# Patient Record
Sex: FEMALE | Race: WHITE | Employment: UNEMPLOYED | URBAN - METROPOLITAN AREA
[De-identification: names, ages, dates, MRNs, and addresses within clinical notes are randomized per-mention and may not be internally consistent; named-entity substitution may affect disease eponyms.]

---

## 2017-01-27 ENCOUNTER — HOSPITAL ENCOUNTER (OUTPATIENT)
Dept: GENERAL RADIOLOGY | Age: 57
Discharge: HOME | End: 2017-01-27
Attending: FAMILY MEDICINE | Admitting: FAMILY MEDICINE

## 2017-01-27 LAB
ALBUMIN SERPL-MCNC: 4.2 GM/DL (ref 3.4–5)
ALP BLD-CCNC: 120 IU/L (ref 40–129)
ALT SERPL-CCNC: 48 U/L (ref 10–40)
ANION GAP SERPL CALCULATED.3IONS-SCNC: 14 MMOL/L (ref 4–16)
AST SERPL-CCNC: 53 IU/L (ref 15–37)
BASOPHILS ABSOLUTE: 0 K/CU MM
BASOPHILS RELATIVE PERCENT: 0.3 % (ref 0–1)
BILIRUB SERPL-MCNC: 0.5 MG/DL (ref 0–1)
BUN BLDV-MCNC: 22 MG/DL (ref 6–23)
CALCIUM SERPL-MCNC: 9.5 MG/DL (ref 8.3–10.6)
CHLORIDE BLD-SCNC: 93 MMOL/L (ref 99–110)
CO2: 26 MMOL/L (ref 21–32)
CREAT SERPL-MCNC: 0.8 MG/DL (ref 0.6–1.1)
DIFFERENTIAL TYPE: ABNORMAL
EOSINOPHILS ABSOLUTE: 0.1 K/CU MM
EOSINOPHILS RELATIVE PERCENT: 1.2 % (ref 0–3)
GFR AFRICAN AMERICAN: >60 ML/MIN/1.73M2
GFR NON-AFRICAN AMERICAN: >60 ML/MIN/1.73M2
GLUCOSE FASTING: 271 MG/DL (ref 70–99)
HCT VFR BLD CALC: 43 % (ref 37–47)
HEMOGLOBIN: 14 GM/DL (ref 12.5–16)
IMMATURE NEUTROPHIL %: 0.2 % (ref 0–0.43)
LYMPHOCYTES ABSOLUTE: 1.2 K/CU MM
LYMPHOCYTES RELATIVE PERCENT: 11.7 % (ref 24–44)
MAGNESIUM: 1.8 MG/DL (ref 1.8–2.4)
MCH RBC QN AUTO: 26.9 PG (ref 27–31)
MCHC RBC AUTO-ENTMCNC: 32.6 % (ref 32–36)
MCV RBC AUTO: 82.7 FL (ref 78–100)
MONOCYTES ABSOLUTE: 0.4 K/CU MM
MONOCYTES RELATIVE PERCENT: 4 % (ref 0–4)
NUCLEATED RBC %: 0 %
PDW BLD-RTO: 14.2 % (ref 11.7–14.9)
PLATELET # BLD: 307 K/CU MM (ref 140–440)
PMV BLD AUTO: 10.7 FL (ref 7.5–11.1)
POTASSIUM SERPL-SCNC: 4 MMOL/L (ref 3.5–5.1)
RBC # BLD: 5.2 M/CU MM (ref 4.2–5.4)
SEGMENTED NEUTROPHILS ABSOLUTE COUNT: 8.5 K/CU MM
SEGMENTED NEUTROPHILS RELATIVE PERCENT: 82.6 % (ref 36–66)
SODIUM BLD-SCNC: 133 MMOL/L (ref 135–145)
TOTAL IMMATURE NEUTOROPHIL: 0.02 K/CU MM
TOTAL NUCLEATED RBC: 0 K/CU MM
TOTAL PROTEIN: 7.3 GM/DL (ref 6.4–8.2)
WBC # BLD: 10.3 K/CU MM (ref 4–10.5)

## 2017-03-28 ENCOUNTER — HOSPITAL ENCOUNTER (OUTPATIENT)
Dept: DIABETES SERVICES | Age: 57
Discharge: HOME | End: 2017-03-31
Attending: FAMILY MEDICINE | Admitting: FAMILY MEDICINE

## 2017-03-28 VITALS — WEIGHT: 293 LBS | HEIGHT: 69 IN | BODY MASS INDEX: 43.4 KG/M2

## 2017-03-28 RX ORDER — GLYBURIDE 1.25 MG/1
2 TABLET ORAL DAILY PRN
COMMUNITY
End: 2020-12-30 | Stop reason: CLARIF

## 2017-03-28 RX ORDER — VENLAFAXINE 25 MG/1
100 TABLET ORAL
COMMUNITY
End: 2017-11-11 | Stop reason: ALTCHOICE

## 2017-04-01 ENCOUNTER — HOSPITAL ENCOUNTER (OUTPATIENT)
Dept: OTHER | Age: 57
Discharge: HOME | End: 2017-04-30
Attending: FAMILY MEDICINE | Admitting: FAMILY MEDICINE

## 2017-05-01 ENCOUNTER — HOSPITAL ENCOUNTER (OUTPATIENT)
Dept: OTHER | Age: 57
Discharge: HOME | End: 2017-05-31
Attending: FAMILY MEDICINE | Admitting: FAMILY MEDICINE

## 2017-11-11 ENCOUNTER — OFFICE VISIT (OUTPATIENT)
Dept: CARDIOLOGY CLINIC | Age: 57
End: 2017-11-11

## 2017-11-11 VITALS
HEART RATE: 68 BPM | BODY MASS INDEX: 43.4 KG/M2 | DIASTOLIC BLOOD PRESSURE: 92 MMHG | WEIGHT: 293 LBS | HEIGHT: 69 IN | SYSTOLIC BLOOD PRESSURE: 136 MMHG

## 2017-11-11 DIAGNOSIS — R06.02 SHORTNESS OF BREATH: Primary | ICD-10-CM

## 2017-11-11 DIAGNOSIS — I10 ESSENTIAL HYPERTENSION: ICD-10-CM

## 2017-11-11 PROCEDURE — 99204 OFFICE O/P NEW MOD 45 MIN: CPT | Performed by: INTERNAL MEDICINE

## 2017-11-11 PROCEDURE — 93000 ELECTROCARDIOGRAM COMPLETE: CPT | Performed by: INTERNAL MEDICINE

## 2017-11-11 RX ORDER — NEBIVOLOL 10 MG/1
10 TABLET ORAL 2 TIMES DAILY
COMMUNITY

## 2017-11-11 RX ORDER — LOSARTAN POTASSIUM AND HYDROCHLOROTHIAZIDE 25; 100 MG/1; MG/1
1 TABLET ORAL PRN
COMMUNITY

## 2017-11-11 RX ORDER — VENLAFAXINE HYDROCHLORIDE 150 MG/1
150 TABLET, EXTENDED RELEASE ORAL
COMMUNITY
End: 2018-04-02

## 2017-11-11 NOTE — PROGRESS NOTES
significant. No palpitations recorded.  H/O cardiovascular stress test 02-    No myocardial ischemia. EF=66%. Normal.  (03-)    H/O cardiovascular stress test 05/12/13    EF 64%, no ischemia, no wall motion abnormality seen, normal perfusion pattern in all myocardial regions.  H/O chest pain     noted on Dr Itzel Cortés request for cardiology services, in patient chart at [facility]    H/O echocardiogram 05/09/13 5/13- EF. 55% mild TR. 2/10Technically difficult. Limited views. LV function normal. EF=>55%  (02-)    H/O transesophageal echocardiography (CLAUDIA) for monitoring 11-    No significant abnormality. No intracardiac shunting.     Heart murmur     Heart palpitations     History of anxiety     History of depression     Hypertension     Hypolipidemia syndrome (HCC)     MR (mitral regurgitation)     Trace - Echo from 11-    Obesity     Ophthalmoplegic migraine     Optic Seizure - focal migraines    Pulmonary hypertension     Mild - Echo from 11-    S/P endometrial ablation     SOB (shortness of breath) on exertion     Tricuspid regurgitation     Mild - Echo from 11-     Past Surgical History:   Procedure Laterality Date    CHOLECYSTECTOMY      HYSTERECTOMY  03-    Dr. Olsen Niece       Family History   Problem Relation Age of Onset    Clotting Disorder Father     Other Mother      AAA    Schizophrenia Brother     Schizophrenia Brother     Other Sister      PVD     Social History   Substance Use Topics    Smoking status: Former Smoker     Years: 4.00     Types: Cigarettes    Smokeless tobacco: Never Used      Comment: Smoked during high school; Caffeine- Occassional pop    Alcohol use No          Review of systems:  HEENT:  Had visual issues  Card:CP, SOB   GI;Neg  : Neg  Neuro: Neg  Psych: Neg  Derm: Neg  MS; Neg  All: Documented  Constitutional: Neg Objective:      Physical Exam:  BP (!) 136/92   Pulse 68   Ht 5' 8.5\" (1.74 m)   Wt (!) 322 lb (146.1 kg)   BMI 48.25 kg/m²   Wt Readings from Last 3 Encounters:   11/11/17 (!) 322 lb (146.1 kg)   03/28/17 (!) 312 lb (141.5 kg)   10/24/13 300 lb (136.1 kg)     Body mass index is 48.25 kg/m². GENERAL - Alert, oriented, pleasant, in no apparent distress. Head unremarkable  Eyes  Not injected conjunctiva  ENT  normal mucosa  Neck - Supple. No jugular venous distention noted. No carotid bruits. Cardiovascular  Normal S1 and S2 without obvious murmur or gallop. Extremities - No cyanosis, clubbing, or significant edema. Pulmonary  No respiratory distress. No wheezes or rales. Pulses: Bilateral radial and pedal pulses normal  Abdomen  no tenderness  Musculoskeletal  normal strength  Neurologic    There are  no gross focal neurologic abnormalities. Skin-  No rash  Affect; normal mood    DATA:  No results found for: CKTOTAL, CKMB, CKMBINDEX, TROPONINI  BNP:  No results found for: BNP  PT/INR:  No results found for: PTINR  No results found for: LABA1C  Lab Results   Component Value Date    CHOL 232 03/02/2009    TRIG 258 03/02/2009    HDL 29 (A) 03/02/2009    LDLCALC 127 03/02/2009     Lab Results   Component Value Date    ALT 48 (H) 01/27/2017    AST 53 (H) 01/27/2017     TSH:  No results found for: TSH      QUALITY MEASURES:  CAD:  No   CHOL LOWERING:  No- if No Why  ANTIPLATELET:  No - if No why  BETA BLOCKER    no  IF  NO WHY  SMOKING HISTORY no COUNSELLED no  ATRIAL FIBRILLATIONno ANTICOAG: no    Assessment/ Plan:     Patient seen , interviewed and examined     - CP and SOB  angelina and echo     -  Hypertension: Patients blood pressure is stable. Patient is advised about low sodium diet. Present medical regimen will not be changed. - ? VHD  reecho    -   DIABETES MELLITUS: Available pertinent lab data reviewed   and  patient was given dietary advice .  Advised to check blood glucose level

## 2017-11-16 ENCOUNTER — TELEPHONE (OUTPATIENT)
Dept: CARDIOLOGY CLINIC | Age: 57
End: 2017-11-16

## 2017-11-17 ENCOUNTER — TELEPHONE (OUTPATIENT)
Dept: CARDIOLOGY CLINIC | Age: 57
End: 2017-11-17

## 2017-11-17 NOTE — TELEPHONE ENCOUNTER
Pt stated that the form she left has to be received by Wednesday next week. She said it could be filled out without the testing being done. She is coming in for testing Monday. FAX: [phone]    Please call pt. Cell phone if not home [phone].

## 2017-11-20 ENCOUNTER — PROCEDURE VISIT (OUTPATIENT)
Dept: CARDIOLOGY CLINIC | Age: 57
End: 2017-11-20

## 2017-11-20 ENCOUNTER — TELEPHONE (OUTPATIENT)
Dept: CARDIOLOGY CLINIC | Age: 57
End: 2017-11-20

## 2017-11-20 DIAGNOSIS — R06.02 SHORTNESS OF BREATH: Primary | ICD-10-CM

## 2017-11-20 DIAGNOSIS — I10 ESSENTIAL HYPERTENSION: ICD-10-CM

## 2017-11-20 DIAGNOSIS — R06.02 SHORTNESS OF BREATH: ICD-10-CM

## 2017-11-20 LAB
LV EF: 58 %
LV EF: 64 %
LVEF MODALITY: NORMAL
LVEF MODALITY: NORMAL

## 2017-11-20 PROCEDURE — 93017 CV STRESS TEST TRACING ONLY: CPT | Performed by: INTERNAL MEDICINE

## 2017-11-20 PROCEDURE — 93306 TTE W/DOPPLER COMPLETE: CPT | Performed by: INTERNAL MEDICINE

## 2017-11-20 PROCEDURE — A9500 TC99M SESTAMIBI: HCPCS | Performed by: INTERNAL MEDICINE

## 2017-11-20 PROCEDURE — 93018 CV STRESS TEST I&R ONLY: CPT | Performed by: INTERNAL MEDICINE

## 2017-11-20 PROCEDURE — 93016 CV STRESS TEST SUPVJ ONLY: CPT | Performed by: INTERNAL MEDICINE

## 2017-11-20 PROCEDURE — 78452 HT MUSCLE IMAGE SPECT MULT: CPT | Performed by: INTERNAL MEDICINE

## 2017-11-20 NOTE — TELEPHONE ENCOUNTER
LV function systolic and size are normal, Ejection Fraction 55-60 %.   Mild left ventricular hypertrophy.   No regional wall motion abnormalities were detected.   Grade I diastolic dysfunction.   Sclerotic, but non-stenotic aortic valve   Normal mitral valve structure and function.   Normal tricuspid valve structure and function.   Trace to mild tricuspid regurgitation.   No evidence of pericardial effusion  Advised pt of echo results.

## 2017-11-21 ENCOUNTER — TELEPHONE (OUTPATIENT)
Dept: CARDIOLOGY CLINIC | Age: 57
End: 2017-11-21

## 2017-12-01 ENCOUNTER — TELEPHONE (OUTPATIENT)
Dept: CARDIOLOGY CLINIC | Age: 57
End: 2017-12-01

## 2017-12-01 NOTE — TELEPHONE ENCOUNTER
Patient advised that Dr. Geneva Benavides would like her to have a [facility]. She wanted to discuss with her . Advised that I will call her back on Friday 12/1/17. She voiced understanding.

## 2017-12-09 ENCOUNTER — OFFICE VISIT (OUTPATIENT)
Dept: CARDIOLOGY CLINIC | Age: 57
End: 2017-12-09

## 2017-12-09 VITALS
WEIGHT: 293 LBS | HEART RATE: 68 BPM | SYSTOLIC BLOOD PRESSURE: 138 MMHG | HEIGHT: 68 IN | DIASTOLIC BLOOD PRESSURE: 82 MMHG | BODY MASS INDEX: 44.41 KG/M2

## 2017-12-09 DIAGNOSIS — I10 ESSENTIAL HYPERTENSION: Primary | ICD-10-CM

## 2017-12-09 PROCEDURE — 99213 OFFICE O/P EST LOW 20 MIN: CPT | Performed by: INTERNAL MEDICINE

## 2017-12-09 NOTE — PROGRESS NOTES
recorded.  H/O cardiovascular stress test 02-    No myocardial ischemia. EF=66%. Normal.  (03-)    H/O cardiovascular stress test 05/12/13    EF 64%, no ischemia, no wall motion abnormality seen, normal perfusion pattern in all myocardial regions.  H/O cardiovascular stress test 11/20/2017    EF64% suggestive of ischemia    H/O chest pain     noted on Dr Yvonne Juarez request for cardiology services, in patient chart at [facility]    H/O echocardiogram 05/09/13 5/13- EF. 55% mild TR. 2/10Technically difficult. Limited views. LV function normal. EF=>55%  (02-)    H/O echocardiogram 11/20/2017    EF55-60% schlerotic non stenosed AV, trace - mild MR    H/O transesophageal echocardiography (CLAUDIA) for monitoring 11-    No significant abnormality. No intracardiac shunting.     Heart murmur     Heart palpitations     History of anxiety     History of depression     Hx of Doppler echocardiogram 11/20/2017    EF55-60%,trace to mild tricuspid regurg    Hypertension     Hypolipidemia syndrome (HCC)     MR (mitral regurgitation)     Trace - Echo from 11-    Obesity     Ophthalmoplegic migraine     Optic Seizure - focal migraines    Pulmonary hypertension     Mild - Echo from 11-    S/P endometrial ablation     SOB (shortness of breath) on exertion     Tricuspid regurgitation     Mild - Echo from 11-     Past Surgical History:   Procedure Laterality Date    CHOLECYSTECTOMY      HYSTERECTOMY  03-    Dr. Gino Chaidez       Family History   Problem Relation Age of Onset    Clotting Disorder Father     Other Mother      AAA    Schizophrenia Brother     Schizophrenia Brother     Other Sister      PVD     Social History   Substance Use Topics    Smoking status: Former Smoker     Years: 4.00     Types: Cigarettes    Smokeless tobacco: Never Used      Comment: Smoked during high be changed. - VHD   No sig VHD    -   DIABETES MELLITUS: Available pertinent lab data reviewed   and  patient was given dietary advice . Advised to check blood glucose level on a regular basis. -   Changes  in medicines made: No           - ?  Antiphopholipid antibody with PCP     - cardiolite abn but had LHC 13 was normal /? braet artifact

## 2018-04-02 PROBLEM — E04.2 MULTINODULAR GOITER: Status: ACTIVE | Noted: 2018-04-02

## 2018-04-24 ENCOUNTER — HOSPITAL ENCOUNTER (OUTPATIENT)
Dept: OTHER | Age: 58
Discharge: HOME | End: 2018-04-24
Attending: SURGERY | Admitting: SURGERY

## 2018-04-24 LAB — CALCIUM SERPL-MCNC: 9.1 MG/DL (ref 8.3–10.6)

## 2018-06-08 ENCOUNTER — OFFICE VISIT (OUTPATIENT)
Dept: CARDIOLOGY CLINIC | Age: 58
End: 2018-06-08

## 2018-06-08 VITALS
SYSTOLIC BLOOD PRESSURE: 132 MMHG | WEIGHT: 293 LBS | HEART RATE: 76 BPM | HEIGHT: 68 IN | DIASTOLIC BLOOD PRESSURE: 84 MMHG | BODY MASS INDEX: 44.41 KG/M2

## 2018-06-08 DIAGNOSIS — I10 ESSENTIAL HYPERTENSION: Primary | ICD-10-CM

## 2018-06-08 DIAGNOSIS — E04.2 MULTINODULAR GOITER: ICD-10-CM

## 2018-06-08 PROCEDURE — 99214 OFFICE O/P EST MOD 30 MIN: CPT | Performed by: INTERNAL MEDICINE

## 2018-06-08 RX ORDER — METFORMIN HYDROCHLORIDE 500 MG/1
TABLET, EXTENDED RELEASE ORAL
Refills: 3 | COMMUNITY
Start: 2018-05-09

## 2018-06-08 RX ORDER — LEVOTHYROXINE SODIUM 200 MCG
TABLET ORAL
Refills: 11 | COMMUNITY
Start: 2018-06-06

## 2018-08-08 ENCOUNTER — TELEPHONE (OUTPATIENT)
Dept: CARDIOLOGY CLINIC | Age: 58
End: 2018-08-08

## 2018-08-08 NOTE — LETTER
ATTENTION PATIENT: Pretesting is to be done before the cath. You do not have to fast for the lab work. You must go to the [facility] at [address] [place]. to have this lab work done. Phone: [phone] Hours: 7:00 am to 5:00 pm Monday  Friday      PHYSICIAN SIGNATURE:      DATE:                                                          [facility] ([facility]) Informed Consent for Anesthesia/Sedation, Surgery, Invasive Procedures, and other High-risk Interventions and Medication use      *This consent is applicable for 30 days following patient signature*    Procedure(s)   [place] authorize, Dr. Garrett Chen    and the associate(s) or assistant(s) of his/her choice, to perform the following procedure(s): [id number]     I know that unexpected conditions may require additional or different procedures than those above. I authorize the above named practitioner(s) perform these as necessary and desirable. This is based on the practitioners professional judgment. The above named practitioner has discussed the above procedure(s) with me, including:  ? Potential benefits, including likelihood of success of the procedure(s) goals  ? Risks  ? Side effects, risk of death, and risk of infection  ? Any potential problems that might occur during recuperation or healing post-procedure  ? Reasonable alternatives  ? Risks of NOT performing the procedure(s)    I acknowledge that no warranty or guarantee has been made to the results the procedure(s). I consent to the above named practitioner(s) providing additional services to me as deemed reasonable and necessary, including but not limited to:    ? Use of medications for anesthesia or sedation. ? All anesthesia and sedation carry risks.   My practitioner has discussed my anticipated anesthesia and/or sedation and the risks of using, risk of not using, benefits, side effects, and alternatives. ? Use of pathology  ? I authorize [facility] ([facility]) to dispose of tissues, specimens or organs when pathology is complete. ? Use of radiology  ? A contrast agent may be required for radiology procedures. My practitioner has advised me of the risks of using, risks of not using, benefits, side effects, and alternatives. ? Observers or use of photography, video/audio recording, or televising of the procedure(s). This is for medical, scientific, or educational purposes. This includes appropriate portions of my body. My identity will not be revealed. ? I consent to release of my social security number and other identifying information to [employer] (FDA), and the supplier/, if I receive tissue, a device, or implant. This is to track the tissue, device, or implant for defect, recall, infection, etc.     ? Use of blood and/or blood products, if needed, through my hospital stay. My practitioner has advised me of the risks of using, risks of not using, benefits, side effects, and alternatives. ___ I do NOT want Blood or Blood products given. (Complete separate  refusal form)    Code Status (shahab one):  ___ I do NOT HAVE a DNR order. I am a Full-code.   I will receive CPR, intubation,  chest compressions, medications, and/or other life saving measures if I have a  cardiac or respiratory arrest.    ___ I have a Do Not Resuscitate (DNR)order.   (shahab one below)  ___  I rescind my DNR for surgery and immediate post-operative period through Phase 2 recovery.    This means, for that time period, I will be a Full-code and receive CPR, intubation, chest compressions, medications, and/or other life saving measures, if I have a cardiac or respiratory arrest.

## 2018-08-14 ENCOUNTER — TELEPHONE (OUTPATIENT)
Dept: CARDIOLOGY CLINIC | Age: 58
End: 2018-08-14

## 2018-08-14 NOTE — TELEPHONE ENCOUNTER
Patient here in office and educated on 08/14/2018, schedule for LHC @ 1000, with arrival @ 0800, @ [facility]; risk explained; and consents signed. Also copy of orders given for labs and CXR due 08/16/2018-08/17/2018 at [facility]. Instruction given to patient to :  NPO after midnight the night before procedure; call hospital at [phone] to pre-register. May take rest of morning meds of procedure except Hyzaar . Hold Metformin the day before, the day of and two days after procedure. Patient voiced understanding. Copies of consent & info sheet given to [facility] for scanning.

## 2018-08-16 ENCOUNTER — HOSPITAL ENCOUNTER (OUTPATIENT)
Dept: GENERAL RADIOLOGY | Age: 58
Discharge: HOME | End: 2018-08-16
Attending: INTERNAL MEDICINE | Admitting: INTERNAL MEDICINE

## 2018-08-16 DIAGNOSIS — Z01.811 PRE-OP CHEST EXAM: ICD-10-CM

## 2018-08-16 LAB
ANION GAP SERPL CALCULATED.3IONS-SCNC: 15 MMOL/L (ref 4–16)
APTT: 29.8 SECONDS (ref 21.2–33)
BASOPHILS ABSOLUTE: 0.1 K/CU MM
BASOPHILS RELATIVE PERCENT: 0.8 % (ref 0–1)
BUN BLDV-MCNC: 14 MG/DL (ref 6–23)
CALCIUM SERPL-MCNC: 10.2 MG/DL (ref 8.3–10.6)
CHLORIDE BLD-SCNC: 94 MMOL/L (ref 99–110)
CO2: 28 MMOL/L (ref 21–32)
CREAT SERPL-MCNC: 1 MG/DL (ref 0.6–1.1)
DIFFERENTIAL TYPE: ABNORMAL
EOSINOPHILS ABSOLUTE: 0.2 K/CU MM
EOSINOPHILS RELATIVE PERCENT: 2 % (ref 0–3)
GFR AFRICAN AMERICAN: >60 ML/MIN/1.73M2
GFR NON-AFRICAN AMERICAN: 57 ML/MIN/1.73M2
GLUCOSE BLD-MCNC: 133 MG/DL (ref 70–99)
HCT VFR BLD CALC: 43.3 % (ref 37–47)
HEMOGLOBIN: 14 GM/DL (ref 12.5–16)
IMMATURE NEUTROPHIL %: 0.4 % (ref 0–0.43)
INR BLD: 1.1 INDEX
LYMPHOCYTES ABSOLUTE: 4.5 K/CU MM
LYMPHOCYTES RELATIVE PERCENT: 37.2 % (ref 24–44)
MCH RBC QN AUTO: 28.9 PG (ref 27–31)
MCHC RBC AUTO-ENTMCNC: 32.3 % (ref 32–36)
MCV RBC AUTO: 89.5 FL (ref 78–100)
MONOCYTES ABSOLUTE: 0.6 K/CU MM
MONOCYTES RELATIVE PERCENT: 4.9 % (ref 0–4)
NUCLEATED RBC %: 0 %
PDW BLD-RTO: 13.7 % (ref 11.7–14.9)
PLATELET # BLD: 435 K/CU MM (ref 140–440)
PMV BLD AUTO: 10.9 FL (ref 7.5–11.1)
POTASSIUM SERPL-SCNC: 4.1 MMOL/L (ref 3.5–5.1)
PROTHROMBIN TIME: 12.5 SECONDS (ref 9.12–12.5)
RBC # BLD: 4.84 M/CU MM (ref 4.2–5.4)
SEGMENTED NEUTROPHILS ABSOLUTE COUNT: 6.7 K/CU MM
SEGMENTED NEUTROPHILS RELATIVE PERCENT: 54.7 % (ref 36–66)
SODIUM BLD-SCNC: 137 MMOL/L (ref 135–145)
TOTAL IMMATURE NEUTOROPHIL: 0.05 K/CU MM
TOTAL NUCLEATED RBC: 0 K/CU MM
WBC # BLD: 12.2 K/CU MM (ref 4–10.5)

## 2018-12-19 ENCOUNTER — OFFICE VISIT (OUTPATIENT)
Dept: CARDIOLOGY CLINIC | Age: 58
End: 2018-12-19
Payer: COMMERCIAL

## 2018-12-19 VITALS
DIASTOLIC BLOOD PRESSURE: 80 MMHG | SYSTOLIC BLOOD PRESSURE: 130 MMHG | HEART RATE: 64 BPM | BODY MASS INDEX: 43.4 KG/M2 | WEIGHT: 293 LBS | HEIGHT: 69 IN

## 2018-12-19 DIAGNOSIS — I10 ESSENTIAL HYPERTENSION: Primary | ICD-10-CM

## 2018-12-19 PROCEDURE — 99213 OFFICE O/P EST LOW 20 MIN: CPT | Performed by: INTERNAL MEDICINE

## 2020-12-15 LAB
CHOLESTEROL, TOTAL: 251 MG/DL
CHOLESTEROL/HDL RATIO: NORMAL
HDLC SERPL-MCNC: 36 MG/DL (ref 35–70)
LDL CHOLESTEROL CALCULATED: 154 MG/DL (ref 0–160)
NONHDLC SERPL-MCNC: NORMAL MG/DL
TRIGL SERPL-MCNC: 324 MG/DL
VLDLC SERPL CALC-MCNC: 61 MG/DL

## 2020-12-29 ENCOUNTER — CLINICAL DOCUMENTATION (OUTPATIENT)
Dept: CARDIOLOGY CLINIC | Age: 60
End: 2020-12-29

## 2020-12-30 ENCOUNTER — OFFICE VISIT (OUTPATIENT)
Dept: CARDIOLOGY CLINIC | Age: 60
End: 2020-12-30
Payer: COMMERCIAL

## 2020-12-30 VITALS
SYSTOLIC BLOOD PRESSURE: 134 MMHG | HEART RATE: 72 BPM | HEIGHT: 69 IN | BODY MASS INDEX: 42.36 KG/M2 | DIASTOLIC BLOOD PRESSURE: 84 MMHG | WEIGHT: 286 LBS

## 2020-12-30 PROCEDURE — 93000 ELECTROCARDIOGRAM COMPLETE: CPT | Performed by: INTERNAL MEDICINE

## 2020-12-30 PROCEDURE — 99204 OFFICE O/P NEW MOD 45 MIN: CPT | Performed by: INTERNAL MEDICINE

## 2020-12-30 RX ORDER — ERGOCALCIFEROL (VITAMIN D2) 1250 MCG
50000 CAPSULE ORAL
COMMUNITY
Start: 2020-02-14 | End: 2020-12-30 | Stop reason: CLARIF

## 2020-12-30 RX ORDER — LORAZEPAM 1 MG/1
0.5 TABLET ORAL EVERY 6 HOURS PRN
COMMUNITY

## 2020-12-30 RX ORDER — EVOLOCUMAB 140 MG/ML
140 INJECTION, SOLUTION SUBCUTANEOUS
Qty: 2 SYRINGE | Refills: 0 | COMMUNITY
Start: 2020-12-30 | End: 2021-04-26

## 2020-12-30 RX ORDER — ALIROCUMAB 75 MG/ML
75 INJECTION, SOLUTION SUBCUTANEOUS
Qty: 10 ML | Refills: 3 | Status: SHIPPED | OUTPATIENT
Start: 2020-12-30 | End: 2020-12-30

## 2020-12-30 RX ORDER — ERGOCALCIFEROL 1.25 MG/1
CAPSULE ORAL
COMMUNITY
Start: 2020-10-22 | End: 2020-12-30 | Stop reason: CLARIF

## 2020-12-30 RX ORDER — ASPIRIN 325 MG
325 TABLET ORAL DAILY
COMMUNITY

## 2020-12-30 NOTE — LETTER
[address] [facility]  1960  [id number]    Have you had any Chest Pain that is not new? - No    Have you had any Shortness of Breath - Yes  If Yes - When on exertion    Have you had any dizziness - No    Have you had any palpitations that are not new? - Yes  If Yes DO EKG - Do you feel your heart skipping  How long does it last - .10  seconds     Do you have any edema - swelling in No      Vein \"LEG PROBLEM Questionnaire\"  1. Do you have prominent leg veins? No   2. Do you have any skin discoloration? No  3. Do you have any healed or active sores? No  4. Do you have swelling of the legs? No  5. Do you have a family history of varicose veins? No  6. Does your profession involve pro-longed        standing or heavy lifting? No  7. Have you been fighting overweight problems? No  8. Do you have restless legs? No  9. Do you have any night time cramps? No  10.  Do you have any of the following in your legs:    Do you have a surgery or procedure scheduled in the near future - No

## 2020-12-30 NOTE — PROGRESS NOTES
CARDIOLOGY NOTE      12/30/2020    RE: Karina Perez  (1960)                               TO:  Dr. Gabriela Thomas DO            Carmine Weiss is a 63 y.o. female who was seen today for management of  hyperlipidimea                                    HPI:                   The patient does not have cardiac complaints  Patient also seen  for    - Hyperlipidimea, importance of hyperlipidimea discussed with pt. - Hypertension,is  well controlled, pt is  compliant with medicines  - Diabetes mellitus, blood glucose level is  well controlled. Pt is compliant with meds and diet      Karina Perez has the following history recorded in care path:  Patient Active Problem List    Diagnosis Date Noted    ASCVD (arteriosclerotic cardiovascular disease)      Priority: High    Multinodular goiter 04/02/2018     Priority: Low    Chest pain 06/06/2013     Priority: Low    H/O cardiovascular stress test 05/12/2013     Priority: Low    HTN (hypertension) 05/08/2013     Priority: Low    Heart palpitations      Priority: Low    Pulmonary hypertension (HCC)      Priority: Low     Current Outpatient Medications   Medication Sig Dispense Refill    LORazepam (ATIVAN) 1 MG tablet Take 0.5 mg by mouth every 6 hours as needed for Anxiety.  aspirin 325 MG tablet Take 325 mg by mouth daily      metFORMIN (GLUCOPHAGE-XR) 500 MG extended release tablet TAKE 2 TABLETS BY MOUTH EVERY DAY  3    SYNTHROID 200 MCG tablet TAKE 1 TABLET BY MOUTH EVERY DAY 250mcg/d  11    venlafaxine (EFFEXOR XR) 150 MG extended release capsule       nebivolol (BYSTOLIC) 10 MG tablet Take 10 mg by mouth 2 times daily      losartan-hydrochlorothiazide (HYZAAR) 100-25 MG per tablet Take 1 tablet by mouth as needed       traMADol (ULTRAM) 50 MG tablet Take 50 mg by mouth every 6 hours as needed.  omeprazole (PRILOSEC) 20 MG capsule        No current facility-administered medications for this visit.       Allergies: Latex, Amoxapine and related, Clozapine, Demerol hcl [meperidine], Amoxicillin, Statins [statins], Canagliflozin, Dapagliflozin, Morphine, Propoxyphene, and Vicodin [hydrocodone-acetaminophen]  Past Medical History:   Diagnosis Date    Antiphospholipid antibody syndrome ([facility] [address]     Arthritis     Deep vein thrombosis (DVT) (HCC)     Right Brachial    Diabetes mellitus (HCC)     GERD (gastroesophageal reflux disease)     H/O 24 hour EKG monitoring 02-    (48 Hours) Baseline rhythm - NS. Brief episodes bradycardia/tachycardia - not clinically significant. No palpitations recorded.  H/O cardiac catheterization 08/20/2018    normal study    H/O cardiovascular stress test 02-    No myocardial ischemia. EF=66%. Normal.  (03-)    H/O cardiovascular stress test 05/12/13    EF 64%, no ischemia, no wall motion abnormality seen, normal perfusion pattern in all myocardial regions.  H/O cardiovascular stress test 11/20/2017    EF64% suggestive of ischemia    H/O chest pain     noted on Dr Katie Augustine request for cardiology services, in patient chart at [facility]    H/O echocardiogram 05/09/13 5/13- EF. 55% mild TR. 2/10Technically difficult. Limited views. LV function normal. EF=>55%  (02-)    H/O echocardiogram 11/20/2017    EF55-60% schlerotic non stenosed AV, trace - mild MR    H/O transesophageal echocardiography (CLAUDIA) for monitoring 11-    No significant abnormality. No intracardiac shunting.     Heart murmur     Heart palpitations     History of anxiety     History of depression     Hx of Doppler echocardiogram 11/20/2017    EF55-60%,trace to mild tricuspid regurg    Hypertension     Hypolipidemia syndrome (HCC)     MR (mitral regurgitation)     Trace - Echo from 11-    Multinodular goiter 4/2/2018    Obesity     Ophthalmoplegic migraine     Optic Seizure - focal migraines    Pulmonary hypertension (HCC)     Mild - Echo from 11-    S/P endometrial ablation     SOB (shortness of breath) on exertion     Tricuspid regurgitation     Mild - Echo from 11-     Past Surgical History:   Procedure Laterality Date    CHOLECYSTECTOMY      HYSTERECTOMY  03-    Dr. Morillo Falling Right     THYROIDECTOMY N/A 04/2018    TONSILLECTOMY AND ADENOIDECTOMY      TUBAL LIGATION        As reviewed   Family History   Problem Relation Age of Onset    Clotting Disorder Father     Diabetes Father     Other Mother         AAA    Asthma Mother     Diabetes Mother     Schizophrenia Brother     Diabetes Brother     Schizophrenia Brother     Diabetes Brother     Other Sister         PVD    Diabetes Sister     Coronary Art Dis Sister      Social History     Tobacco Use    Smoking status: Former Smoker     Years: 4.00     Types: Cigarettes    Smokeless tobacco: Never Used    Tobacco comment: Smoked during high school; Caffeine- Occassional pop   Substance Use Topics    Alcohol use: No      Review of Systems:    Constitutional: Negative for diaphoresis and fatigue  Psychological:Negative for anxiety or depression  HENT: Negative for headaches, nasal congestion, sinus pain or vertigo  Eyes: Negative for visual disturbance.    Endocrine: Negative for polydipsia/polyuria  Respiratory: Negative for shortness of breath  Cardiovascular: Negative for chest pain, dyspnea on exertion, claudication, edema, irregular heartbeat, murmur, palpitations or shortness of breath  Gastrointestinal: Negative for abdominal pain or heartburn  Genito-Urinary: Negative for urinary frequency/urgency  Musculoskeletal: Negative for muscle pain, muscular weakness, negative for pain in arm and leg or swelling in foot and leg  Neurological: Negative for dizziness, headaches, memory loss, numbness/tingling, visual changes, syncope  Dermatological: Negative for rash    Objective:    Vitals:    12/30/20 1213   BP: 134/84   Pulse: 72   Weight: 286 lb (129.7 kg)   Height: 5' 8.5\" (1.74 m)     /84   Pulse 72   Ht 5' 8.5\" (1.74 m)   Wt 286 lb (129.7 kg)   BMI 42.85 kg/m²     No flowsheet data found. Wt Readings from Last 3 Encounters:   12/30/20 286 lb (129.7 kg)   12/19/18 (!) 308 lb (139.7 kg)   08/20/18 (!) 312 lb (141.5 kg)     Body mass index is 42.85 kg/m². GENERAL - Alert, oriented, pleasant, in no apparent distress. EYES: No jaundice, no conjunctival pallor. SKIN: It is warm & dry. No rashes. No Echhymosis    HEENT - No clinically significant abnormalities seen. Neck - Supple. No jugular venous distention noted. No carotid bruits. Cardiovascular - Normal S1 and S2 without obvious murmur or gallop. Extremities - No cyanosis, clubbing, or significant edema. Pulmonary - No respiratory distress. No wheezes or rales. Abdomen - No masses, tenderness, or organomegaly. Musculoskeletal - No significant edema. No joint deformities. No muscle wasting. Neurologic - Cranial nerves II through XII are grossly intact. There were no gross focal neurologic abnormalities.     Lab Review   No results found for: CKTOTAL, CKMB, CKMBINDEX, TROPONINT  BNP:  No results found for: BNP  PT/INR:    Lab Results   Component Value Date    INR 1.10 08/16/2018     No results found for: LABA1C  Lab Results   Component Value Date    WBC 12.2 (H) 08/16/2018    HCT 43.3 08/16/2018    MCV 89.5 08/16/2018     08/16/2018     Lab Results   Component Value Date    CHOL 232 03/02/2009    TRIG 258 03/02/2009    HDL 29 (A) 03/02/2009    LDLCALC 127 03/02/2009     Lab Results   Component Value Date    ALT 48 (H) 01/27/2017    AST 53 (H) 01/27/2017     BMP:    Lab Results   Component Value Date     08/16/2018    K 4.1 08/16/2018    CL 94 08/16/2018    CO2 28 08/16/2018    BUN 14 08/16/2018    CREATININE 1.0 08/16/2018     CMP:   Lab Results   Component Value Date     08/16/2018    K 4.1 08/16/2018    CL 94 08/16/2018    CO2 28 08/16/2018    BUN 14 08/16/2018    PROT 7.3 01/27/2017     TSH:  No results found for: TSH, TSHHS      Impression:    1. Palpitations    2. Shortness of breath       Patient Active Problem List   Diagnosis Code    Heart palpitations R00.2    Pulmonary hypertension (HCC) I27.20    HTN (hypertension) I10    H/O cardiovascular stress test Z92.89    Chest pain R07.9    Multinodular goiter E04.2    ASCVD (arteriosclerotic cardiovascular disease) I25.10       Assessment & Plan:    -  LIPID MANAGEMENT:  Importance of lipid levels discussed with patient   and patient was given dietary advice. NCEP- ATP III guidelines reviewed with patient. -   Changes  in medicines made: Yes        Intolerant to statin  Gets myalgia  Have tried multiple statin and zetia ascwell need PCSK 9                      -  Hypertension: Patients blood pressure is normal. Patient is advised about low sodium diet. Present medical regimen will not be changed. - Has low TSH  Dose adjusted    -   DIABETES MELLITUS: Available pertinent lab data reviewed   and  patient was given dietary advice . Advised to check blood glucose level on a regular basis. -   Changes  in medicines made: No                Mortality from the morbid obesity is very high: Body mass index is 42.85 kg/m².                              Barrie Joseph MD    [facility] [place]

## 2021-01-21 ENCOUNTER — TELEPHONE (OUTPATIENT)
Dept: CARDIOLOGY CLINIC | Age: 61
End: 2021-01-21

## 2021-01-21 NOTE — LETTER
[place] [address] [place], [place] [address]  [place], [address] [facility]  Phone: [phone]     Fax: [phone]  MD Eris Galeas MD Tonye Rickers, MD Esequiel Boys, MD Lorelle Dues, MD Marzella Dart, MD         2021    ATTN: Medical Review  RE: Panfilo Lancaster  : 1960  Payor: Marleen Karel #: [id number]     To whom it may concern:       I am writing on behalf of Panfilo Lancaster to formally document the medical necessity for administering REPATHA (evolobumab). Shanerosangela Negreteet is indicated to reduce the risk of myocardial infarction (MI), stroke, and unstable angina requiring hospitalization in adults with established cardiovascular disease; and as adjunct to diet, alone or in combination with other lipid-lowering therapies (e.g., statins, ezetimibe), for the treatment of adults with primary hyperlipidemia (including heterozygous familial hypercholesterolemia) to reduce low-density lipoprotein cholesterol LDL-C. This letter provides the clinical history, treatment rationale, and other documents that support the use of REPATHA for this patient. Carlo Christianson is a 63year-old female who was has Hyperlipidemia with Lipid levels as follows; Cholesterol: 251, HDL: 36, Triglycerides: 324 and LDL: 154. Currently the patient exercises daily, consumes a healthy diet, does not smoke, yet continues to have worsening Lipid Levels. Patient is currently not taking any lipid lowering therapy due to intolerance to statins caused by myalgias to include Zetia and will benefit from taking Repatha injections as this promotes better risk stratification in decreasing the chances of increasing lipid levels and possible cardiac events. REPATHA (evolobumab) is a PCSK9 (Proprotein Convertase Subtilisin/Kexin Type 9) inhibitor antibody indicated as adjunct to diet and maximally tolerated statin therapy for the treatment of adults with heterozygous familial hypercholesterolemia or clinical atherosclerotic cardiovascular disease, who require additional lowering of LDL-C. In summary, based on my clinical judgement, Blanca London is medically necessary for Tigre Ernst. Please contact me at [phone] if additional information is required for approval of this request.      Thank you for your immediate attention to this very important matter.        Sincerely,       Bernadette Bailey MD, [facility] [place]   FA/bjmary ann

## 2021-02-01 ENCOUNTER — TELEPHONE (OUTPATIENT)
Dept: CARDIOLOGY CLINIC | Age: 61
End: 2021-02-01

## 2021-02-01 DIAGNOSIS — E78.2 MIXED HYPERLIPIDEMIA: Primary | ICD-10-CM

## 2021-03-17 NOTE — TELEPHONE ENCOUNTER
Per Radha Salvador NP:    Intolerant to statin   Gets myalgia   Have tried multiple statin and zetia marleni need PCSK 9 .  OK to order calcium scoring if needed

## 2021-03-22 ENCOUNTER — TELEPHONE (OUTPATIENT)
Dept: CARDIOLOGY CLINIC | Age: 61
End: 2021-03-22

## 2021-03-22 NOTE — TELEPHONE ENCOUNTER
Patient scheduled for CT Calcium Scoring 3/25/21 at 1 pm with arrival of 1230 pm at [facility]. Patient also advised that out of pocket cost is flat fee of $109. No prep for test. Patient voiced understanding.

## 2021-03-25 ENCOUNTER — HOSPITAL ENCOUNTER (OUTPATIENT)
Dept: CT IMAGING | Age: 61
Discharge: HOME | End: 2021-03-25
Payer: COMMERCIAL

## 2021-03-25 DIAGNOSIS — E78.2 MIXED HYPERLIPIDEMIA: ICD-10-CM

## 2021-03-25 PROCEDURE — 75571 CT HRT W/O DYE W/CA TEST: CPT

## 2021-03-26 NOTE — TELEPHONE ENCOUNTER
Patient advised of CAC scoring results. Advised that I will check with Dr. Marlo Kanner for further instruction on 4/7/21. She voice understanding.

## 2021-03-29 NOTE — TELEPHONE ENCOUNTER
Left message on Patient's voicemail to call office to get results of CT Cardiac Calcium scoring. Calcium Score : Left Main:0 Agatston , Left Anterior Descending Artery :2    Agatston  Left Circumflex : 0 Agatston  Right coronary artery : 5 Agatston            Total score of  7  Agatston of coronary calcification   However aortic calcification noted    CALCIUM SCORE:  1-10   Plaque Baldwin:  Minimal identifiable. Probability of Significant CAD:  Very unlikely, <10%   Cardiovascular risk:  Low. Recommendation:  Discuss general public health guidelines for primary    prevention of cardiovascular disease.

## 2022-09-24 ENCOUNTER — HOSPITAL ENCOUNTER (EMERGENCY)
Age: 62
Discharge: HOME | End: 2022-09-24
Attending: EMERGENCY MEDICINE
Payer: COMMERCIAL

## 2022-09-24 ENCOUNTER — APPOINTMENT (OUTPATIENT)
Dept: GENERAL RADIOLOGY | Age: 62
End: 2022-09-24
Payer: COMMERCIAL

## 2022-09-24 VITALS
HEART RATE: 96 BPM | WEIGHT: 286 LBS | OXYGEN SATURATION: 97 % | DIASTOLIC BLOOD PRESSURE: 102 MMHG | BODY MASS INDEX: 43.35 KG/M2 | SYSTOLIC BLOOD PRESSURE: 221 MMHG | TEMPERATURE: 97.6 F | RESPIRATION RATE: 18 BRPM | HEIGHT: 68 IN
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
## 2022-09-24 DIAGNOSIS — T07.XXXA STRAIN OF MUSCLE OF MULTIPLE SITES: ICD-10-CM

## 2022-09-24 DIAGNOSIS — S49.92XA INJURY OF LEFT SHOULDER, INITIAL ENCOUNTER: ICD-10-CM

## 2022-09-24 PROCEDURE — 96372 THER/PROPH/DIAG INJ SC/IM: CPT

## 2022-09-24 PROCEDURE — 71046 X-RAY EXAM CHEST 2 VIEWS: CPT

## 2022-09-24 PROCEDURE — 6360000002 HC RX W HCPCS: Performed by: EMERGENCY MEDICINE

## 2022-09-24 PROCEDURE — 93005 ELECTROCARDIOGRAM TRACING: CPT | Performed by: EMERGENCY MEDICINE

## 2022-09-24 PROCEDURE — 99284 EMERGENCY DEPT VISIT MOD MDM: CPT

## 2022-09-24 PROCEDURE — 73030 X-RAY EXAM OF SHOULDER: CPT

## 2022-09-24 RX ORDER — KETOROLAC TROMETHAMINE 30 MG/ML
30 INJECTION, SOLUTION INTRAMUSCULAR; INTRAVENOUS ONCE
Status: COMPLETED | OUTPATIENT
Start: 2022-09-24 | End: 2022-09-24

## 2022-09-24 RX ADMIN — KETOROLAC TROMETHAMINE 30 MG: 30 INJECTION, SOLUTION INTRAMUSCULAR at 14:30
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
## 2022-09-24 NOTE — ED NOTES
Trauma Assessment:  Arrived via:  [x]Ambulance  []Helicopter  []Police  [] Private Vehicle  [x] EMS  []Transfer from:   _________________________  []EMS report in      Pt chart Pre-hospital Interventions  Airway:  [x]Oral         []Nasal       []Intubated      []O2  []IV size _____ site ________       []IV #2 size _____ site ________  []Blood sugar _______ mg/dl  []CPR         []LBB         []C collar         []MAST  []Splint type __________ location ___________         Meds:  []Morphine ______ mg       []Versed ______mg   [] _______________________    ______mg       Airway [x]Patent/talking  [x]Clear  []Partially obstructed  []Completely obstructed  []Breathing assisted  []Intubated  [] ___________________     Breathing [x]Spontaneous  []Labored  []Agonal  []No effort    Trachea:  [x]Midline  []Deviated  []R  []L    Chest wall symmetry:  []Symmetrical  []Asymmetrical Lung sounds:   L  R  [x] [x]Present  [] []Clear  [] []Diminished  [] []Absent  [] []Rales  [] []Rhonchi  [] []Wheezes               Circulation Skin:  [x]Warm      [x]Pink  []Cool        []Pale  []Hot          []Flushed  [x]Dry          []Ashen  []Moist       []Cyanotic  []Diaphoretic   Pulse:  [x]Central pulse present  [x]Peripheral pulse present  []No pulse    [x]Strong  []Thready  Capillary refill   ___<3____ sec.      Disability Iraida Coma Scale (GCS)    Eye Opening Verbal Motor    [x]4 Spontaneous  []3 To Verbal  []2 To Pain  []1 None [x]5 Oriented  []4 Confused  []3 Inappropriate response  []2 Incomprehensible  []1 None/Intubated [x]6 Obeys  []5 Localizes pain  []4 Withdraws from pain  []3 Flexor posturing  []2 Extensor posturing  []1 None/chemically paralyzed     Pupils   L  [x]Brisk  []Sluggish   []Non-reactive  ___4____mm R  [x]Brisk  []Sluggish  []Non-reactive  ___4___mm         Head []Pain/tenderness  Drainage from: []ears   []nose   []mouth     Neck []Pain/tenderness  []JVD   Chest []Pain/tenderness    []Dyspnea       [] Deformity [] Paradoxical expansion   Abdomen []Pain    []Tender        []Rigid             [x]Bowel sounds present   []Soft     []Guarded      []Distended    []Bowel sounds absent  Emesis/gastrocult:  []+   []-     Pelvis/  Genital []Pain/tenderness          Pelvis: [x]stable   []unstable              []Blood at the meatus    Rectal tone: []present   []absent      Hemocult: []+   []-   Extremities [x]Pain/tenderness           [x]CMS intact x4  [x]Moves all extremities    [x]Extremities warm and pink   Back []Pain/tenderness  []Deformity          Henrietta Finley RN  09/24/22 [id number]

## 2022-09-25 LAB
EKG ATRIAL RATE: 83 BPM
EKG DIAGNOSIS: NORMAL
EKG P AXIS: 40 DEGREES
EKG P-R INTERVAL: 196 MS
EKG Q-T INTERVAL: 406 MS
EKG QRS DURATION: 120 MS
EKG QTC CALCULATION (BAZETT): 477 MS
EKG R AXIS: -46 DEGREES
EKG T AXIS: 48 DEGREES
EKG VENTRICULAR RATE: 83 BPM

## 2022-09-25 PROCEDURE — 93010 ELECTROCARDIOGRAM REPORT: CPT | Performed by: INTERNAL MEDICINE

## 2022-12-01 ENCOUNTER — TELEPHONE (OUTPATIENT)
Dept: CARDIOLOGY CLINIC | Age: 62
End: 2022-12-01

## 2022-12-01 NOTE — TELEPHONE ENCOUNTER
Left message for patient requesting a return call to schedule an echo for shortness of breath, atherosclerotic heart disease and essential hypertension per referral from [facility]. No auth required.

## 2022-12-09 ENCOUNTER — PROCEDURE VISIT (OUTPATIENT)
Dept: CARDIOLOGY CLINIC | Age: 62
End: 2022-12-09

## 2022-12-09 DIAGNOSIS — R06.02 SOB (SHORTNESS OF BREATH): Primary | ICD-10-CM

## 2022-12-16 ENCOUNTER — TELEPHONE (OUTPATIENT)
Dept: CARDIOLOGY CLINIC | Age: 62
End: 2022-12-16

## 2023-03-08 ENCOUNTER — TELEPHONE (OUTPATIENT)
Dept: CARDIOLOGY CLINIC | Age: 63
End: 2023-03-08

## 2023-03-08 NOTE — TELEPHONE ENCOUNTER
Cardiologist: Dr. Chico Walsh  Surgeon: Dr. Lottie Deras  Surgery: Left knee chondroplasty/ Med Meniscectomy  Anesthesia: General  Date: TBD  FAX# [phone]    Ph# [phone]    Last OV 12/30/2020 w/Piper      -  LIPID MANAGEMENT:  Importance of lipid levels discussed with patient   and patient was given dietary advice. NCEP- ATP III guidelines reviewed with patient. -   Changes  in medicines made: Yes        Intolerant to statin  Gets myalgia  Have tried multiple statin and zetia ascwell need PCSK 9                       -  Hypertension: Patients blood pressure is normal. Patient is advised about low sodium diet. Present medical regimen will not be changed. - Has low TSH  Dose adjusted     -   DIABETES MELLITUS: Available pertinent lab data reviewed   and  patient was given dietary advice . Advised to check blood glucose level on a regular basis. -   Changes  in medicines made: No          Last EKG- 9/25/2022        NM- 11/20/2017   ECG portion of stress test is negative for ischemia by diagnostic criteria. Normal EF 64 % with normal ventricular contractility. Moderate intensity , large size anterior defect seen in stress images    suggestive of ischemia    Abnormal study , abnormal stress images         Echo- 12/9/2022   Left ventricular function and size is normal, EF is estimated at 55-60%. Moderate left ventricular hypertrophy. Grade I diastolic dysfunction. No regional wall motion abnormalities were detected. No significant valvular disease noted. Mild tricuspid regurgitation; RVSP is 23 mmHg. Dilation of the aortic root(3.8cm) and the ascending aorta(4.1cm). No evidence of pericardial effusion.       Cath- 8/20/2018   NORMAL CORONARIES    NORMAL EF       Aspirin

## 2023-03-15 ENCOUNTER — OFFICE VISIT (OUTPATIENT)
Dept: CARDIOLOGY CLINIC | Age: 63
End: 2023-03-15
Payer: COMMERCIAL

## 2023-03-15 VITALS
DIASTOLIC BLOOD PRESSURE: 76 MMHG | BODY MASS INDEX: 42.03 KG/M2 | WEIGHT: 283.8 LBS | SYSTOLIC BLOOD PRESSURE: 118 MMHG | HEIGHT: 69 IN

## 2023-03-15 DIAGNOSIS — Z01.810 PRE-OPERATIVE CARDIOVASCULAR EXAMINATION: Primary | ICD-10-CM

## 2023-03-15 PROCEDURE — 3078F DIAST BP <80 MM HG: CPT | Performed by: INTERNAL MEDICINE

## 2023-03-15 PROCEDURE — 3074F SYST BP LT 130 MM HG: CPT | Performed by: INTERNAL MEDICINE

## 2023-03-15 PROCEDURE — 93000 ELECTROCARDIOGRAM COMPLETE: CPT | Performed by: INTERNAL MEDICINE

## 2023-03-15 PROCEDURE — 99214 OFFICE O/P EST MOD 30 MIN: CPT | Performed by: INTERNAL MEDICINE

## 2023-03-15 RX ORDER — LEVOTHYROXINE SODIUM 0.1 MG/1
300 TABLET ORAL DAILY
COMMUNITY

## 2023-03-15 RX ORDER — EZETIMIBE 10 MG/1
10 TABLET ORAL DAILY
COMMUNITY

## 2023-03-15 RX ORDER — INSULIN GLARGINE 100 [IU]/ML
INJECTION, SOLUTION SUBCUTANEOUS NIGHTLY
COMMUNITY

## 2023-03-15 RX ORDER — GLIMEPIRIDE 4 MG/1
4 TABLET ORAL
COMMUNITY

## 2023-03-15 NOTE — PATIENT INSTRUCTIONS
Please be informed that if you contact our office outside of normal business hours the physician on call cannot help with any scheduling or rescheduling issues, procedure instruction questions or any type of medication issue. We advise you for any urgent/emergency that you go to the nearest emergency room! PLEASE CALL OUR OFFICE DURING NORMAL BUSINESS HOURS    Monday - Friday   8 am to 5 pm    [address]: [phone]    [place]:  [phone]  **It is YOUR responsibilty to bring medication bottles and/or updated medication list to [address] [place] [address]. This will allow us to better serve you and all your healthcare needs**  [facility] Locations - No appointment necessary. Sites open Monday to Friday. Call your preferred location for test preparation, business   hours and other information you need. [employer]. [address] [place], [address] [facility]  Phone: [phone] [place]  [address] [facility]  [address] [place], [address] [facility]  Phone: [phone]     Thank you for allowing us to care for you today! We want to ensure we can follow your treatment plan and we strive to give you the best outcomes and experience possible. If you ever have a life threatening emergency and call 911 - for an ambulance (EMS)   Our providers can only care for you at:   [facility] or [facility]. Even if you have someone take you or you drive yourself we can only care for you in a [facility] facility. Our providers are not setup at the other healthcare locations!

## 2023-03-15 NOTE — PROGRESS NOTES
CARDIOLOGY NOTE      3/15/2023    RE: Cielo Holder  (1960)                               TO:  Dr. Landen Guzman DO            Jovon Khan is a 60 y.o. female who was seen today for management of  hyperlipidimea                                  For knee surgery cl  HPI:                   The patient does not have cardiac complaints  Patient also seen  for    - Hyperlipidimea, importance of hyperlipidimea discussed with pt. - Hypertension,is  well controlled, pt is  compliant with medicines  - Diabetes mellitus, blood glucose level is  well controlled. Pt is compliant with meds and diet      Cielo Holder has the following history recorded in care path:  Patient Active Problem List    Diagnosis Date Noted    ASCVD (arteriosclerotic cardiovascular disease)     Multinodular goiter 04/02/2018    Chest pain 06/06/2013    H/O cardiovascular stress test 05/12/2013    HTN (hypertension) 05/08/2013    Heart palpitations     Pulmonary hypertension (HCC)      Current Outpatient Medications   Medication Sig Dispense Refill    glimepiride (AMARYL) 4 MG tablet Take 4 mg by mouth every morning (before breakfast)      levothyroxine (SYNTHROID) 100 MCG tablet Take 300 mcg by mouth Daily      linaGLIPtin (TRADJENTA PO) Take by mouth      ezetimibe (ZETIA) 10 MG tablet Take 10 mg by mouth daily      insulin glargine (LANTUS) 100 UNIT/ML injection vial Inject into the skin nightly      Evolocumab (REPATHA SC) Inject into the skin      LORazepam (ATIVAN) 1 MG tablet Take 0.5 mg by mouth every 6 hours as needed for Anxiety.       aspirin 325 MG tablet Take 325 mg by mouth daily      metFORMIN (GLUCOPHAGE-XR) 500 MG extended release tablet 2,000 mg daily (with breakfast)  3    venlafaxine (EFFEXOR XR) 150 MG extended release capsule       nebivolol (BYSTOLIC) 10 MG tablet Take 10 mg by mouth daily      losartan-hydrochlorothiazide (HYZAAR) 100-25 MG per tablet Take 1 tablet by mouth as needed traMADol (ULTRAM) 50 MG tablet Take 50 mg by mouth every 6 hours as needed. omeprazole (PRILOSEC) 20 MG capsule       SYNTHROID 200 MCG tablet TAKE 1 TABLET BY MOUTH EVERY DAY 250mcg/d (Patient not taking: Reported on 3/15/2023)  11     No current facility-administered medications for this visit. Allergies: Latex, Amoxapine and related, Clozapine, Demerol hcl [meperidine], Amoxicillin, Jardiance [empagliflozin], Ozempic (0.25 or 0.5 mg-dose) [semaglutide(0.25 or 0.5mg-dos)], Statins [statins], Trulicity [dulaglutide], Canagliflozin, Dapagliflozin, Morphine, Propoxyphene, and Vicodin [hydrocodone-acetaminophen]  Past Medical History:   Diagnosis Date    Antiphospholipid antibody syndrome ([facility] [address] 11/2002    Arthritis     Class 3 severe obesity with body mass index (BMI) of 40.0 to 44.9 in adult [facility])     Deep vein thrombosis (DVT) (HCC)     Right Brachial    Diabetes mellitus (HCC)     GERD (gastroesophageal reflux disease)     H/O 24 hour EKG monitoring 02/24/2004    (48 Hours) Baseline rhythm - NS. Brief episodes bradycardia/tachycardia - not clinically significant. No palpitations recorded. H/O cardiac catheterization 08/20/2018    normal study    H/O cardiovascular stress test 02/25/2010    No myocardial ischemia. EF=66%. Normal.  (03-)    H/O cardiovascular stress test 05/12/2013    EF 64%, no ischemia, no wall motion abnormality seen, normal perfusion pattern in all myocardial regions. H/O cardiovascular stress test 11/20/2017    EF64% suggestive of ischemia    H/O chest pain     noted on Dr Jamey Roberts request for cardiology services, in patient chart at [facility]    H/O echocardiogram 05/09/2013 5/13- EF. 55% mild TR. 2/10Technically difficult. Limited views. LV function normal. EF=>55%  (02-)    H/O echocardiogram 11/20/2017    EF55-60% schlerotic non stenosed AV, trace - mild MR    H/O transesophageal echocardiography (CLAUDIA) for monitoring 11/25/2002    No significant abnormality.  No intracardiac shunting. Heart murmur     Heart palpitations     History of anxiety     History of depression     Hx of Doppler echocardiogram 11/20/2017    EF55-60%,trace to mild tricuspid regurg    Hypertension     Hypolipidemia syndrome ([address]     MR (mitral regurgitation)     Trace - Echo from 11-    Multinodular goiter 04/02/2018    Ophthalmoplegic migraine     Optic Seizure - focal migraines    Pulmonary hypertension (HCC)     Mild - Echo from 11-    S/P endometrial ablation     SOB (shortness of breath) on exertion     Tricuspid regurgitation     Mild - Echo from 11-     Past Surgical History:   Procedure Laterality Date    CHOLECYSTECTOMY      HYSTERECTOMY ([facility]  03-    Dr. Varun Medina Right     THYROIDECTOMY N/A 04/2018    TONSILLECTOMY AND ADENOIDECTOMY      TUBAL LIGATION        As reviewed   Family History   Problem Relation Age of Onset    Clotting Disorder Father     Diabetes Father     Other Mother         AAA    Asthma Mother     Diabetes Mother     Schizophrenia Brother     Diabetes Brother     Schizophrenia Brother     Diabetes Brother     Other Sister         PVD    Diabetes Sister     Coronary Art Dis Sister      Social History     Tobacco Use    Smoking status: Former     Years: 4.00     Types: Cigarettes    Smokeless tobacco: Never    Tobacco comments:     Smoked during high school; Caffeine- Occassional pop   Substance Use Topics    Alcohol use: No      Review of Systems:    Constitutional: Negative for diaphoresis and fatigue  Psychological:Negative for anxiety or depression  HENT: Negative for headaches, nasal congestion, sinus pain or vertigo  Eyes: Negative for visual disturbance.    Endocrine: Negative for polydipsia/polyuria  Respiratory: Negative for shortness of breath  Cardiovascular: Negative for chest pain, dyspnea on exertion, claudication, edema, irregular heartbeat, murmur, palpitations or shortness of breath  Gastrointestinal: Negative for abdominal pain or heartburn  Genito-Urinary: Negative for urinary frequency/urgency  Musculoskeletal: Negative for muscle pain, muscular weakness, negative for pain in arm and leg or swelling in foot and leg  Neurological: Negative for dizziness, headaches, memory loss, numbness/tingling, visual changes, syncope  Dermatological: Negative for rash    Objective:    Vitals:    03/15/23 1324   BP: 118/76   Site: Left Upper Arm   Position: Sitting   Cuff Size: Large Adult   Weight: 283 lb 12.8 oz (128.7 kg)   Height: 5' 8.75\" (1.746 m)     /76 (Site: Left Upper Arm, Position: Sitting, Cuff Size: Large Adult)   Ht 5' 8.75\" (1.746 m)   Wt 283 lb 12.8 oz (128.7 kg)   BMI 42.22 kg/m²     No flowsheet data found. Wt Readings from Last 3 Encounters:   03/15/23 283 lb 12.8 oz (128.7 kg)   09/24/22 286 lb (129.7 kg)   12/30/20 286 lb (129.7 kg)     Body mass index is 42.22 kg/m². GENERAL - Alert, oriented, pleasant, in no apparent distress. EYES: No jaundice, no conjunctival pallor. SKIN: It is warm & dry. No rashes. No Echhymosis    HEENT - No clinically significant abnormalities seen. Neck - Supple. No jugular venous distention noted. No carotid bruits. Cardiovascular - Normal S1 and S2 without obvious murmur or gallop. Extremities - No cyanosis, clubbing, or significant edema. Pulmonary - No respiratory distress. No wheezes or rales. Abdomen - No masses, tenderness, or organomegaly. Musculoskeletal - No significant edema. No joint deformities. No muscle wasting. Neurologic - Cranial nerves II through XII are grossly intact. There were no gross focal neurologic abnormalities.     Lab Review   No results found for: CKTOTAL, CKMB, CKMBINDEX, TROPONINT  BNP:  No results found for: BNP  PT/INR:    Lab Results   Component Value Date    INR 1.10 08/16/2018     No results found for: LABA1C  Lab Results   Component Value Date    WBC 12.2 (H) 08/16/2018    HCT 43.3 08/16/2018    MCV 89.5 08/16/2018     08/16/2018     Lab Results   Component Value Date    CHOL 251 12/15/2020    TRIG 324 12/15/2020    HDL 36 12/15/2020    LDLCALC 154 12/15/2020     Lab Results   Component Value Date    ALT 48 (H) 01/27/2017    AST 53 (H) 01/27/2017     BMP:    Lab Results   Component Value Date/Time     08/16/2018 01:33 PM    K 4.1 08/16/2018 01:33 PM    CL 94 08/16/2018 01:33 PM    CO2 28 08/16/2018 01:33 PM    BUN 14 08/16/2018 01:33 PM    CREATININE 1.0 08/16/2018 01:33 PM     CMP:   Lab Results   Component Value Date/Time     08/16/2018 01:33 PM    K 4.1 08/16/2018 01:33 PM    CL 94 08/16/2018 01:33 PM    CO2 28 08/16/2018 01:33 PM    BUN 14 08/16/2018 01:33 PM    PROT 7.3 01/27/2017 02:44 PM     TSH:  No results found for: TSH, TSHHS      Impression:    1. Pre-operative cardiovascular examination       Patient Active Problem List   Diagnosis Code    Heart palpitations R00.2    Pulmonary hypertension (HCC) I27.20    HTN (hypertension) I10    H/O cardiovascular stress test Z92.89    Chest pain R07.9    Multinodular goiter E04.2    ASCVD (arteriosclerotic cardiovascular disease) I25.10       Assessment & Plan:    -  LIPID MANAGEMENT:  Importance of lipid levels discussed with patient   and patient was given dietary advice. NCEP- ATP III guidelines reviewed with patient. -   Changes  in medicines made: no                    On repatha    -  Hypertension: Patients blood pressure is normal. Patient is advised about low sodium diet. Present medical regimen will not be changed. - on synthroid    -   DIABETES MELLITUS: Available pertinent lab data reviewed   and  patient was given dietary advice . Advised to check blood glucose level on a regular basis. -   Changes  in medicines made: No        On insulin , high a1c        Mortality from the morbid obesity is very high: Body mass index is 42.22 kg/m².        - aortic dilatation 2 year fu       Conclusions Summary   Left ventricular function and size is normal, EF is estimated at 55-60%. Moderate left ventricular hypertrophy. Grade I diastolic dysfunction. No regional wall motion abnormalities were detected. No significant valvular disease noted. Mild tricuspid regurgitation; RVSP is 23 mmHg. Dilation of the aortic root(3.8cm) and the ascending aorta(4.1cm). No evidence of pericardial effusion.       Signature      ------------------------------------------------------------------   Electronically signed by Ami Garcia MD   (Interpreting physician) on 12/09/2022 at 02:48 PM                 [address] Elroy Goldmann MD    [facility]

## 2023-06-06 NOTE — TELEPHONE ENCOUNTER
2425 Rafael Peña RN sent to sit 1:1 with pt.       Alexia Hodge RN  06/06/23 [address] [employer] called has some questions   About PA for Guerda , They are asking   If we have a CAC score on this patient   Ref # [id number]

## 2024-11-26 ENCOUNTER — OFFICE VISIT (OUTPATIENT)
Dept: CARDIOLOGY CLINIC | Age: 64
End: 2024-11-26
Payer: COMMERCIAL

## 2024-11-26 VITALS
BODY MASS INDEX: 34.43 KG/M2 | WEIGHT: 227.2 LBS | DIASTOLIC BLOOD PRESSURE: 76 MMHG | SYSTOLIC BLOOD PRESSURE: 114 MMHG | HEART RATE: 94 BPM | HEIGHT: 68 IN | OXYGEN SATURATION: 96 %

## 2024-11-26 DIAGNOSIS — I10 PRIMARY HYPERTENSION: Primary | ICD-10-CM

## 2024-11-26 DIAGNOSIS — I77.810 DILATED AORTIC ROOT (HCC): ICD-10-CM

## 2024-11-26 DIAGNOSIS — R06.02 SHORTNESS OF BREATH: ICD-10-CM

## 2024-11-26 PROCEDURE — 3074F SYST BP LT 130 MM HG: CPT | Performed by: NURSE PRACTITIONER

## 2024-11-26 PROCEDURE — 99214 OFFICE O/P EST MOD 30 MIN: CPT | Performed by: NURSE PRACTITIONER

## 2024-11-26 PROCEDURE — 1123F ACP DISCUSS/DSCN MKR DOCD: CPT | Performed by: NURSE PRACTITIONER

## 2024-11-26 PROCEDURE — 3078F DIAST BP <80 MM HG: CPT | Performed by: NURSE PRACTITIONER

## 2024-11-26 RX ORDER — TIRZEPATIDE 2.5 MG/.5ML
2.5 INJECTION, SOLUTION SUBCUTANEOUS
COMMUNITY
Start: 2024-11-22

## 2024-11-26 RX ORDER — OMEGA-3S/DHA/EPA/FISH OIL/D3 300MG-1000
400 CAPSULE ORAL DAILY
COMMUNITY

## 2024-11-26 ASSESSMENT — ENCOUNTER SYMPTOMS
SHORTNESS OF BREATH: 1
ORTHOPNEA: 0

## 2024-11-26 NOTE — PROGRESS NOTES
11/26/2024  Primary cardiologist: Dr. Saldaña    CC:   Jael  is an established 64 y.o.  female here for a follow up on hypertension      SUBJECTIVE/OBJECTIVE:  Jael is a 64 y.o. female with a history of hypertension, hyperlipidemia, diabetes mellitus, hypothyroid, dilated aortic root and TIA    HPI:  Jael reports overall she is feeling well.  She has noted some dyspnea on exertion.  Walking from parking lot to the office she was short of breath.  No orthopnea or PND.  Notes fleeting chest discomfort that she describes as a spasm across the chest lasting for a second or two.  There are no associated symptoms    Review of Systems   Constitutional: Negative for diaphoresis and malaise/fatigue.   Cardiovascular:  Positive for dyspnea on exertion. Negative for chest pain, claudication, irregular heartbeat, leg swelling, near-syncope, orthopnea, palpitations and paroxysmal nocturnal dyspnea.   Respiratory:  Positive for shortness of breath.    Neurological:  Negative for dizziness and light-headedness.       Vitals:    11/26/24 0915   BP: 114/76   Site: Left Upper Arm   Position: Sitting   Cuff Size: Medium Adult   Pulse: 94   SpO2: 96%   Weight: 103.1 kg (227 lb 3.2 oz)   Height: 1.727 m (5' 7.99\")     Wt Readings from Last 3 Encounters:   11/26/24 103.1 kg (227 lb 3.2 oz)   12/13/23 128.4 kg (283 lb)   03/15/23 128.7 kg (283 lb 12.8 oz)      Body mass index is 34.55 kg/m².     Physical Exam  Vitals reviewed.   Eyes:      Pupils: Pupils are equal, round, and reactive to light.   Neck:      Vascular: No carotid bruit.   Cardiovascular:      Rate and Rhythm: Normal rate and regular rhythm.      Pulses: Normal pulses.   Pulmonary:      Effort: Pulmonary effort is normal.      Breath sounds: Normal breath sounds. No rales.   Chest:      Chest wall: No tenderness.   Musculoskeletal:      Cervical back: No tenderness.      Right lower leg: No edema.      Left lower leg: No edema.   Skin:     General: Skin is warm and

## 2024-11-26 NOTE — PATIENT INSTRUCTIONS
Please be informed that if you contact our office outside of normal business hours the physician on call cannot help with any scheduling or rescheduling issues, procedure instruction questions or any type of medication issue.    We advise you for any urgent/emergency that you go to the nearest emergency room!    PLEASE CALL OUR OFFICE DURING NORMAL BUSINESS HOURS    Monday - Friday   8 am to 5 pm    [place]: [phone]    [place]: [phone]    [place]:  [phone]    **It is YOUR responsibilty to bring medication bottles and/or updated medication list to EACH APPOINTMENT. This will allow us to better serve you and all your healthcare needs**    Thank you for allowing us to care for you today!   We want to ensure we can follow your treatment plan and we strive to give you the best outcomes and experience possible.   If you ever have a life threatening emergency and call 911 - for an ambulance (EMS)   Our providers can only care for you at:   [facility] or [facility].   Even if you have someone take you or you drive yourself we can only care for you in a [facility] facility. Our providers are not setup at the other healthcare locations!

## 2024-12-18 DIAGNOSIS — R06.02 SHORTNESS OF BREATH: Primary | ICD-10-CM

## 2024-12-18 NOTE — PROGRESS NOTES
Echo concerning for hypokinesis- will check NM stress test. She endorses shortness of breath - walking short distances she is short of breath. ? Anginal equivalent-

## 2024-12-19 ENCOUNTER — TELEPHONE (OUTPATIENT)
Dept: CARDIOLOGY CLINIC | Age: 64
End: 2024-12-19

## 2024-12-19 NOTE — TELEPHONE ENCOUNTER
Notified    Echo (TTE) complete       Left Ventricle normal ventricular systolic function with a visually estimated EF of 55%. Left ventricle size is normal. Mildly increased wall thickness.    No significant valvular disease or regurgitation noted.    Aorta: Normal sized aortic root. Mildly dilated ascending aorta. Ao ascending diameter is 4.0 cm.    Pericardium: No pericardial effusion.    Image quality is good.      Nuclear lexiscan stress test with myocardial perfusion     Image quality is good.    Stress Test: A pharmacological stress test was performed using regadenoson (Lexiscan). 50 mg of aminophylline given as a reversal agent. Hemodynamics are adequate for diagnosis. Blood pressure demonstrated a normal response and heart rate demonstrated a normal response to stress. The patient's heart rate recovery was normal.    No ischemic EKG changes were noted on Lexiscan    Cardiolite study demonstrates an area anterior apical defect with slight improvement on the resting images rest of the myocardium shows normal tracer uptake both assess resting images ejection fraction by gated study 69% with normal global regional left ventricular systolic function    Cardiolite demonstrates a possibility of mild anterior apical ischemia versus an artifact, patient had cardiac catheterization done in the past which has been unremarkable   Suggest clinical correlation    OV to discuss results

## 2025-01-29 ENCOUNTER — OFFICE VISIT (OUTPATIENT)
Dept: CARDIOLOGY CLINIC | Age: 65
End: 2025-01-29
Payer: COMMERCIAL

## 2025-01-29 VITALS
HEART RATE: 77 BPM | OXYGEN SATURATION: 98 % | WEIGHT: 229.8 LBS | SYSTOLIC BLOOD PRESSURE: 126 MMHG | HEIGHT: 67 IN | DIASTOLIC BLOOD PRESSURE: 84 MMHG | BODY MASS INDEX: 36.07 KG/M2

## 2025-01-29 DIAGNOSIS — I10 HYPERTENSION, UNSPECIFIED TYPE: Primary | ICD-10-CM

## 2025-01-29 PROCEDURE — 3079F DIAST BP 80-89 MM HG: CPT | Performed by: INTERNAL MEDICINE

## 2025-01-29 PROCEDURE — 1123F ACP DISCUSS/DSCN MKR DOCD: CPT | Performed by: INTERNAL MEDICINE

## 2025-01-29 PROCEDURE — 3074F SYST BP LT 130 MM HG: CPT | Performed by: INTERNAL MEDICINE

## 2025-01-29 PROCEDURE — 99214 OFFICE O/P EST MOD 30 MIN: CPT | Performed by: INTERNAL MEDICINE

## 2025-01-29 NOTE — PROGRESS NOTES
been normal and her in the past we also discussed the possibility of CTA coronary however will hold off on that and will follow her      -  LIPID MANAGEMENT:  Importance of lipid levels discussed with patient   and patient was given dietary advice. NCEP- ATP III guidelines reviewed with patient.    -   Changes  in medicines made: no                    On repatha  check LDL 90    -  Hypertension: Patients blood pressure is normal. Patient is advised about low sodium diet. Present medical regimen will not be changed.       - on synthroid  100mcgm     -   DIABETES MELLITUS: Available pertinent lab data reviewed   and  patient was given dietary advice . Advised to check blood glucose level on a regular basis.      -   Changes  in medicines made: No        On insulin , high A1c  6.3        Mortality from the morbid obesity is very high:     Body mass index is 35.99 kg/m².       - aortic dilatation 2 year fu        Left Ventricle normal ventricular systolic function with a visually estimated EF of 55%. Left ventricle size is normal. Mildly increased wall thickness.    No significant valvular disease or regurgitation noted.    Aorta: Normal sized aortic root. Mildly dilated ascending aorta. Ao ascending diameter is 4.0 cm.    Pericardium: No pericardial effusion.    Image quality is good.               LOW CARDIAC RISK FOR KNEE SURGERY          LEANNA PIERCE MD    [facility]

## 2025-02-25 NOTE — PROGRESS NOTES
Anesthesia Pre-Procedure Evaluation    Patient: Bal Junior   MRN: [id number] : 1957        Procedure : Procedure(s):  TAKE-DOWN, FUNDOPLICATION, NISSEN, LAPAROSCOPIC- ROBOTIC  Laparoscopic assisted insertion tube jejunostomy  Esophagoscopy, gastroscopy, duodenoscopy (EGD)          Past Medical History:   Diagnosis Date     Hypertension      Neuritis      Peripheral neuropathy      Personal history of other medical treatment     postgastrectomy dumping syndrome     Stomach problems Noted earlier      Past Surgical History:   Procedure Laterality Date     ABDOMEN SURGERY  ?     APPENDECTOMY  1964?     COLONOSCOPY  ,      ESOPHAGEAL BALLOON PROVOCATION STUDY N/A 2024    Procedure: Esophageal Balloon Provocation Study;  Surgeon: Carson Michel DO;  Location:  GI     ESOPHAGOSCOPY, GASTROSCOPY, DUODENOSCOPY (EGD), COMBINED N/A 2024    Procedure: ESOPHAGOGASTRODUODENOSCOPY, WITH BIOPSY;  Surgeon: Carson Michel DO;  Location:  GI     ESOPHAGOSCOPY, GASTROSCOPY, DUODENOSCOPY (EGD), COMBINED N/A 10/8/2024    Procedure: ESOPHAGOGASTRODUODENOSCOPY, WITH FINE NEEDLE ASPIRATION BIOPSY, WITH ENDOSCOPIC ULTRASOUND GUIDANCE;  Surgeon: Lance Lopez MD;  Location:  GI     EYE SURGERY  199x    laser for retinal tears     GASTRIC FUNDOPLICATION       HERNIA REPAIR  ?     IR CHEST PORT PLACEMENT > 5 YRS OF AGE  10/24/2024     LAPAROSCOPIC TAKEDOWN NISSEN FUNDOPLICATION N/A 2020    Procedure: TAKE-DOWN, FUNDOPLICATION, REDO NISSEN, LAPAROSCOPIC, gastrostomy feeding tube placement;  Surgeon: Katlyn Tobar MD;  Location:  OR     IL ESOPHAGOGASTRODUODENOSCOPY TRANSORAL DIAGNOSTIC N/A 2019    Procedure: UPPER GASTROINTESTINAL ENDOSCOPY;  Surgeon: Dino Ward MD;  Location: [facility];  Service: General     SOFT TISSUE SURGERY  ?    MCL l. thumb      Allergies   Allergen Reactions     Hornets      Wasp Venom Protein Hives     Bee Venom Swelling      Social  History     Tobacco Use     Smoking status: Never     Passive exposure: Past     Smokeless tobacco: Never   Substance Use Topics     Alcohol use: Yes     Alcohol/week: 3.0 - 6.0 standard drinks of alcohol     Types: 1 - 2 Glasses of wine, 1 - 2 Cans of beer, 1 - 2 Shots of liquor per week     Comment: glass of wine with dinner, can of beer in evening or 1 whiskey  or bourbon      Wt Readings from Last 1 Encounters:   02/20/25 88.5 kg (195 lb 3.2 oz)        Anesthesia Evaluation   Pt has had prior anesthetic. Type: General.    No history of anesthetic complications       ROS/MED HX  ENT/Pulmonary:     (+)     JESSICA risk factors, snores loudly, hypertension,                              (-) tobacco use   Neurologic:  - neg neurologic ROS  (-) no seizures and no CVA   Cardiovascular:     (+)  hypertension- -   -  - -                                 Previous cardiac testing   Echo: Date: 2/6/25 Results:  Interpretation Summary  Left ventricular size, wall motion and function are normal. The ejection  fraction is 60-65%.  Right ventricular function, chamber size, wall motion, and thickness are  normal.  No hemodynamically significant valvular abnormalities.  IVC is normal.  There is no prior study for direct comparison.  Stress Test:  Date: Results:    ECG Reviewed:  Date: 12/2024 Results:  Sinus tachycardia   Low voltage QRS   Borderline ECG   Unconfirmed report - interpretation of this ECG is computer generated - see medical record for final interpretation     Cath:  Date: Results:   (-) taking anticoagulants/antiplatelets   METS/Exercise Tolerance: >4 METS Comment: Walking 10,000-12,000 steps, skiing. Denies CHAWLA or CP   Hematologic:  - neg hematologic  ROS  (-) history of blood clots and history of blood transfusion   Musculoskeletal:  - neg musculoskeletal ROS     GI/Hepatic:     (+) GERD,     hiatal hernia,              Renal/Genitourinary:  - neg Renal ROS     Endo:  - neg endo ROS  (-) chronic steroid usage  "  Psychiatric/Substance Use:       Infectious Disease:  - neg infectious disease ROS     Malignancy:   (+) Malignancy, History of GI.GI CA  Active status post Chemo.      Other:            Physical Exam    Airway        Mallampati: III   TM distance: > 3 FB   Neck ROM: full   Mouth opening: > 3 cm    Respiratory Devices and Support         Dental       (+) Modest Abnormalities - crowns, retainers, 1 or 2 missing teeth      Cardiovascular   cardiovascular exam normal       Rhythm and rate: regular and normal     Pulmonary   pulmonary exam normal        breath sounds clear to auscultation         OUTSIDE LABS:  CBC:   Lab Results   Component Value Date    WBC 5.6 02/20/2025    WBC 23.3 (H) 12/16/2024    HGB 13.6 02/20/2025    HGB 13.4 01/28/2025    HCT 39.9 (L) 02/20/2025    HCT 39.3 (L) 12/16/2024     (L) 02/20/2025     12/16/2024     BMP:   Lab Results   Component Value Date     02/20/2025     12/16/2024    POTASSIUM 4.2 02/20/2025    POTASSIUM 3.7 12/16/2024    CHLORIDE 108 (H) 02/20/2025    CHLORIDE 103 12/16/2024    CO2 24 02/20/2025    CO2 22 12/16/2024    BUN 12.8 02/20/2025    BUN 16.0 12/16/2024    CR 0.88 02/20/2025    CR 0.77 12/16/2024    GLC 80 02/20/2025     (H) 12/16/2024     COAGS: No results found for: \"PTT\", \"INR\", \"FIBR\"  POC:   Lab Results   Component Value Date     (H) 10/28/2020     HEPATIC:   Lab Results   Component Value Date    ALBUMIN 3.9 02/20/2025    PROTTOTAL 5.7 (L) 12/16/2024    ALT 25 12/16/2024    AST 25 12/16/2024    ALKPHOS 183 (H) 12/16/2024    BILITOTAL 0.3 12/16/2024     OTHER:   Lab Results   Component Value Date    PH 7.35 09/25/2020    LACT 2.1 (H) 12/16/2024    A1C 6.0 (H) 01/17/2024    AUGUSTIN 8.9 02/20/2025    MAG 2.0 12/16/2024    TSH 1.75 12/16/2024       Anesthesia Plan    ASA Status:  3    NPO Status:  NPO Appropriate    Anesthesia Type: General.     - Airway: ETT   Induction: Intravenous, Propofol.   Maintenance: Balanced. " "  Techniques and Equipment:     - Airway: Video-Laryngoscope     - Lines/Monitors: 2nd IV, BIS     Consents    Anesthesia Plan(s) and associated risks, benefits, and realistic alternatives discussed. Questions answered and patient/representative(s) expressed understanding.     - Discussed: Risks, Benefits and Alternatives for BOTH SEDATION and the PROCEDURE were discussed     - Discussed with:  Patient, Spouse      - Extended Intubation/Ventilatory Support Discussed: No.      - Patient is DNR/DNI Status: No     Use of blood products discussed: Yes.     - Discussed with: Patient.     - Consented: consented to blood products     Postoperative Care    Pain management: IV analgesics, Oral pain medications, Multi-modal analgesia.   PONV prophylaxis: Ondansetron (or other 5HT-3), Dexamethasone or Solumedrol, Background Propofol Infusion     Comments:               Yo Samuel MD    I have reviewed the pertinent notes and labs in the chart from the past 30 days and (re)examined the patient.  Any updates or changes from those notes are reflected in this note.    Clinically Significant Risk Factors Present on Admission                   # Hypertension: Noted on problem list           # Overweight: Estimated body mass index is 28.01 kg/m  as calculated from the following:    Height as of 2/20/25: 1.778 m (5' 10\").    Weight as of 2/20/25: 88.5 kg (195 lb 3.2 oz).                " 45.48 kg/m²   Wt Readings from Last 3 Encounters:   12/19/18 (!) 308 lb (139.7 kg)   08/20/18 (!) 312 lb (141.5 kg)   06/08/18 (!) 315 lb (142.9 kg)     Body mass index is 45.48 kg/m². GENERAL - Alert, oriented, pleasant, in no apparent distress. EYES: No jaundice, no conjunctival pallor. SKIN: It is warm & dry. No rashes. No Echhymosis    HEENT - No clinically significant abnormalities seen. Neck - Supple. No jugular venous distention noted. No carotid bruits. Cardiovascular - Normal S1 and S2 without obvious murmur or gallop. Extremities - No cyanosis, clubbing, or significant edema. Pulmonary - No respiratory distress. No wheezes or rales. Abdomen - No masses, tenderness, or organomegaly. Musculoskeletal - No significant edema. No joint deformities. No muscle wasting. Neurologic - Cranial nerves II through XII are grossly intact. There were no gross focal neurologic abnormalities. Lab Review   No results found for: CKTOTAL, CKMB, CKMBINDEX, TROPONINT  BNP:  No results found for: BNP  PT/INR:    Lab Results   Component Value Date    INR 1.10 08/16/2018     No results found for: LABA1C  Lab Results   Component Value Date    WBC 12.2 (H) 08/16/2018    HCT 43.3 08/16/2018    MCV 89.5 08/16/2018     08/16/2018     Lab Results   Component Value Date    CHOL 232 03/02/2009    TRIG 258 03/02/2009    HDL 29 (A) 03/02/2009    LDLCALC 127 03/02/2009     Lab Results   Component Value Date    ALT 48 (H) 01/27/2017    AST 53 (H) 01/27/2017     BMP:    Lab Results   Component Value Date     08/16/2018    K 4.1 08/16/2018    CL 94 08/16/2018    CO2 28 08/16/2018    BUN 14 08/16/2018    CREATININE 1.0 08/16/2018     CMP:   Lab Results   Component Value Date     08/16/2018    K 4.1 08/16/2018    CL 94 08/16/2018    CO2 28 08/16/2018    BUN 14 08/16/2018    PROT 7.3 01/27/2017     TSH:  No results found for: TSH, TSHHS    Impression:    No diagnosis found.    Patient Active Problem List